# Patient Record
Sex: FEMALE | Race: WHITE | Employment: UNEMPLOYED | URBAN - METROPOLITAN AREA
[De-identification: names, ages, dates, MRNs, and addresses within clinical notes are randomized per-mention and may not be internally consistent; named-entity substitution may affect disease eponyms.]

---

## 2017-02-02 DIAGNOSIS — B96.89 BV (BACTERIAL VAGINOSIS): Primary | ICD-10-CM

## 2017-02-02 DIAGNOSIS — N76.0 BV (BACTERIAL VAGINOSIS): Primary | ICD-10-CM

## 2017-02-02 RX ORDER — CLINDAMYCIN PHOSPHATE 20 MG/G
CREAM VAGINAL
Qty: 1 TUBE | Refills: 0 | Status: SHIPPED | OUTPATIENT
Start: 2017-02-02 | End: 2017-02-09

## 2017-02-03 ENCOUNTER — TELEPHONE (OUTPATIENT)
Dept: GYNECOLOGY | Age: 52
End: 2017-02-03

## 2017-08-29 DIAGNOSIS — B37.31 VAGINAL YEAST INFECTION: Primary | ICD-10-CM

## 2017-08-30 RX ORDER — FLUCONAZOLE 150 MG/1
150 TABLET ORAL ONCE
Qty: 1 TABLET | Refills: 1 | Status: SHIPPED | OUTPATIENT
Start: 2017-08-30 | End: 2017-08-30

## 2017-10-12 ENCOUNTER — OFFICE VISIT (OUTPATIENT)
Dept: GYNECOLOGY | Age: 52
End: 2017-10-12

## 2017-10-12 VITALS
SYSTOLIC BLOOD PRESSURE: 125 MMHG | OXYGEN SATURATION: 95 % | DIASTOLIC BLOOD PRESSURE: 84 MMHG | BODY MASS INDEX: 38.11 KG/M2 | WEIGHT: 229 LBS | HEART RATE: 77 BPM

## 2017-10-12 DIAGNOSIS — N89.8 VAGINAL ITCHING: Primary | ICD-10-CM

## 2017-10-12 LAB
BACTERIA WET PREP: NORMAL
CLUE CELLS: NORMAL
EPITHELIAL CELLS WET PREP: NORMAL
RBC WET PREP: NORMAL
SOURCE WET PREP: NORMAL
TRICHOMONAS PREP: NORMAL
WBC WET PREP: NORMAL
YEAST WET PREP: NORMAL

## 2017-10-12 PROCEDURE — 99213 OFFICE O/P EST LOW 20 MIN: CPT | Performed by: NURSE PRACTITIONER

## 2017-10-12 RX ORDER — NYSTATIN 100000 [USP'U]/G
POWDER TOPICAL
Qty: 1 BOTTLE | Refills: 0 | Status: SHIPPED | OUTPATIENT
Start: 2017-10-12 | End: 2018-07-27 | Stop reason: ALTCHOICE

## 2017-10-12 RX ORDER — FLUCONAZOLE 150 MG/1
150 TABLET ORAL ONCE
Qty: 1 TABLET | Refills: 0 | Status: SHIPPED | OUTPATIENT
Start: 2017-10-12 | End: 2017-10-12

## 2017-10-12 NOTE — PATIENT INSTRUCTIONS
Patient Education        Bacterial Vaginosis: Care Instructions  Your Care Instructions    Bacterial vaginosis is a type of vaginal infection. It is caused by excess growth of certain bacteria that are normally found in the vagina. Symptoms can include itching, swelling, pain when you urinate or have sex, and a gray or yellow discharge with a \"fishy\" odor. It is not considered an infection that is spread through sexual contact. Although symptoms can be annoying and uncomfortable, bacterial vaginosis does not usually cause other health problems. However, if you have it while you are pregnant, it can cause complications. While the infection may go away on its own, most doctors use antibiotics to treat it. You may have been prescribed pills or vaginal cream. With treatment, bacterial vaginosis usually clears up in 5 to 7 days. Follow-up care is a key part of your treatment and safety. Be sure to make and go to all appointments, and call your doctor if you are having problems. It's also a good idea to know your test results and keep a list of the medicines you take. How can you care for yourself at home? · Take your antibiotics as directed. Do not stop taking them just because you feel better. You need to take the full course of antibiotics. · Do not eat or drink anything that contains alcohol if you are taking metronidazole (Flagyl). · Keep using your medicine if you start your period. Use pads instead of tampons while using a vaginal cream or suppository. Tampons can absorb the medicine. · Wear loose cotton clothing. Do not wear nylon and other materials that hold body heat and moisture close to the skin. · Do not scratch. Relieve itching with a cold pack or a cool bath. · Do not wash your vaginal area more than once a day. Use plain water or a mild, unscented soap. Do not douche. When should you call for help?   Watch closely for changes in your health, and be sure to contact your doctor if:  · You have medicine. · Ask your doctor about over-the-counter (OTC) medicines for yeast infections. They may cost less than prescription medicines. If you use an OTC treatment, read and follow all instructions on the label. · Do not use tampons while using a vaginal cream or suppository. The tampons can absorb the medicine. Use pads instead. · Wear loose cotton clothing. Do not wear nylon or other fabric that holds body heat and moisture close to the skin. · Try sleeping without underwear. · Do not scratch. Relieve itching with a cold pack or a cool bath. · Do not wash your vaginal area more than once a day. Use plain water or a mild, unscented soap. Air-dry the vaginal area. · Change out of wet swimsuits after swimming. · Do not have sex until you have finished your treatment. · Do not douche. When should you call for help? Call your doctor now or seek immediate medical care if:  · You have unexpected vaginal bleeding. · You have new or increased pain in your vagina or pelvis. Watch closely for changes in your health, and be sure to contact your doctor if:  · You have a fever. · You are not getting better after 2 days. · Your symptoms come back after you finish your medicines. Where can you learn more? Go to https://CardioGenicspeAdKeeper.Antegrin Therapeutics. org and sign in to your DigiMeld account. Enter K460 in the [facility] box to learn more about \"Vaginal Yeast Infection: Care Instructions. \"     If you do not have an account, please click on the \"Sign Up Now\" link. Current as of: October 13, 2016  Content Version: 11.3  © 2986-6110 RABBL. Care instructions adapted under license by [facility] ([facility]). If you have questions about a medical condition or this instruction, always ask your healthcare professional. [place] [address] any warranty or liability for your use of this information.        Patient Education        Yeast Skin Infection: Care Instructions  Your Care

## 2017-10-14 LAB
GENITAL CULTURE, ROUTINE: ABNORMAL
GENITAL CULTURE, ROUTINE: ABNORMAL
ORGANISM: ABNORMAL

## 2017-10-16 RX ORDER — CLINDAMYCIN PHOSPHATE 20 MG/G
CREAM VAGINAL
Qty: 1 TUBE | Refills: 0 | Status: SHIPPED | OUTPATIENT
Start: 2017-10-16 | End: 2018-07-27 | Stop reason: ALTCHOICE

## 2018-05-23 DIAGNOSIS — B37.31 VAGINAL YEAST INFECTION: Primary | ICD-10-CM

## 2018-05-23 RX ORDER — FLUCONAZOLE 150 MG/1
150 TABLET ORAL ONCE
Qty: 2 TABLET | Refills: 0 | Status: SHIPPED | OUTPATIENT
Start: 2018-05-23 | End: 2018-05-23

## 2018-07-27 ENCOUNTER — OFFICE VISIT (OUTPATIENT)
Dept: GYNECOLOGY | Age: 53
End: 2018-07-27

## 2018-07-27 VITALS
BODY MASS INDEX: 38.61 KG/M2 | SYSTOLIC BLOOD PRESSURE: 132 MMHG | DIASTOLIC BLOOD PRESSURE: 75 MMHG | HEART RATE: 71 BPM | WEIGHT: 232 LBS

## 2018-07-27 DIAGNOSIS — R35.0 URINARY FREQUENCY: ICD-10-CM

## 2018-07-27 DIAGNOSIS — Z12.4 SCREENING FOR CERVICAL CANCER: ICD-10-CM

## 2018-07-27 DIAGNOSIS — Z01.419 WOMEN'S ANNUAL ROUTINE GYNECOLOGICAL EXAMINATION: Primary | ICD-10-CM

## 2018-07-27 DIAGNOSIS — Z12.39 SCREENING FOR BREAST CANCER: ICD-10-CM

## 2018-07-27 DIAGNOSIS — N30.10 INTERSTITIAL CYSTITIS: ICD-10-CM

## 2018-07-27 PROCEDURE — 99396 PREV VISIT EST AGE 40-64: CPT | Performed by: NURSE PRACTITIONER

## 2018-07-27 PROCEDURE — 81002 URINALYSIS NONAUTO W/O SCOPE: CPT | Performed by: NURSE PRACTITIONER

## 2018-07-27 RX ORDER — ROSUVASTATIN CALCIUM 10 MG/1
10 TABLET, COATED ORAL DAILY
COMMUNITY

## 2018-07-27 NOTE — PATIENT INSTRUCTIONS
risk for heart attack and stroke. · Blood pressure. Have your blood pressure checked during a routine doctor visit. Your doctor will tell you how often to check your blood pressure based on your age, your blood pressure results, and other factors. · Mammogram. Ask your doctor how often you should have a mammogram, which is an X-ray of your breasts. A mammogram can spot breast cancer before it can be felt and when it is easiest to treat. · Pap test and pelvic exam. Ask your doctor how often you should have a Pap test. You may not need to have a Pap test as often as you used to. · Vision. Have your eyes checked every year or two or as often as your doctor suggests. Some experts recommend that you have yearly exams for glaucoma and other age-related eye problems starting at age 48. · Hearing. Tell your doctor if you notice any change in your hearing. You can have tests to find out how well you hear. · Diabetes. Ask your doctor whether you should have tests for diabetes. · Colon cancer. You should begin tests for colon cancer at age 48. You may have one of several tests. Your doctor will tell you how often to have tests based on your age and risk. Risks include whether you already had a precancerous polyp removed from your colon or whether your parents, sisters and brothers, or children have had colon cancer. · Thyroid disease. Talk to your doctor about whether to have your thyroid checked as part of a regular physical exam. Women have an increased chance of a thyroid problem. · Osteoporosis. You should begin tests for bone density at age 72. If you are younger than 72, ask your doctor whether you have factors that may increase your risk for this disease. You may want to have this test before age 72. · Heart attack and stroke risk. At least every 4 to 6 years, you should have your risk for heart attack and stroke assessed.  Your doctor uses factors such as your age, blood pressure, cholesterol, and whether you the best health, women should have no more than 1 drink a day or 7 drinks a week. · If you smoke, quit. When you quit smoking, you lower your chances of getting many types of cancer. You can also do your best to eat well, be active, and stay at a healthy weight. Eating healthy foods and being active every day, as well as staying at a healthy weight, may help prevent cancer. Where can you learn more? Go to https://CrestaTechgabrielle.Agworld Pty Ltd. org and sign in to your [employer] account. Enter W751 in the REALTIME.CO box to learn more about \"Learning About Breast Cancer Screening. \"     If you do not have an account, please click on the \"Sign Up Now\" link. Current as of: May 12, 2017  Content Version: 11.6  © 5093-6246 [employer] Solar [employer], RevoLaze. Care instructions adapted under license by [facility] ([facility]). If you have questions about a medical condition or this instruction, always ask your healthcare professional. [place] [address] any warranty or liability for your use of this information. Patient Education        Breast Self-Exam: Care Instructions  Your Care Instructions    A breast self-exam is when you check your breasts for lumps or changes. This regular exam helps you learn how your breasts normally look and feel. Most breast problems or changes are not because of cancer. Breast self-exam is not a substitute for a mammogram. Having regular breast exams by your doctor and regular mammograms improve your chances of finding any problems with your breasts. Some women set a time each month to do a step-by-step breast self-exam. Other women like a less formal system. They might look at their breasts as they brush their teeth, or feel their breasts once in a while in the shower. If you notice a change in your breast, tell your doctor. Follow-up care is a key part of your treatment and safety. Be sure to make and go to all appointments, and call your doctor if you are having problems. It's also a good idea to know your test results and keep a list of the medicines you take. How do you do a breast self-exam?  · The best time to examine your breasts is usually one week after your menstrual period begins. Your breasts should not be tender then. If you do not have periods, you might do your exam on a day of the month that is easy to remember. · To examine your breasts:  ¨ Remove all your clothes above the waist and lie down. When you are lying down, your breast tissue spreads evenly over your chest wall, which makes it easier to feel all your breast tissue. ¨ Use the pads-not the fingertips-of the 3 middle fingers of your left hand to check your right breast. Move your fingers slowly in small coin-sized circles that overlap. ¨ Use three levels of pressure to feel of all your breast tissue. Use light pressure to feel the tissue close to the skin surface. Use medium pressure to feel a little deeper. Use firm pressure to feel your tissue close to your breastbone and ribs. Use each pressure level to feel your breast tissue before moving on to the next spot. ¨ Check your entire breast, moving up and down as if following a strip from the collarbone to the bra line, and from the armpit to the ribs. Repeat until you have covered the entire breast.  ¨ Repeat this procedure for your left breast, using the pads of the 3 middle fingers of your right hand. · To examine your breasts while in the shower:  ¨ Place one arm over your head and lightly soap your breast on that side. ¨ Using the pads of your fingers, gently move your hand over your breast (in the strip pattern described above), feeling carefully for any lumps or changes. ¨ Repeat for the other breast.  · Have your doctor inspect anything you notice to see if you need further testing. Where can you learn more? Go to https://chgabrielle.Jixee. org and sign in to your Dr. TATTOFF account.  Enter P148 in the ZUGGI box to cereal.  ¨ 2½ cups of vegetables, especially:  § Dark-green vegetables such as broccoli and spinach. § Orange vegetables such as carrots and sweet potatoes. § Dry beans (such as lopez and kidney beans) and peas (such as lentils). ¨ 2 cups of fresh, frozen, or canned fruit. A small apple or 1 banana or orange equals 1 cup. ¨ 3 cups of nonfat or low-fat milk, yogurt, or other milk products. ¨ 5½ ounces of meat and beans, such as chicken, fish, lean meat, beans, nuts, and seeds. One egg, 1 tablespoon of peanut butter, ½ ounce nuts or seeds, or ¼ cup of cooked beans equals 1 ounce of meat. · Learn how to read food labels for serving sizes and ingredients. Fast-food and convenience-food meals often contain few or no fruits or vegetables. Make sure you eat some fruits and vegetables to make the meal more nutritious. · Look at your food diary. For each food group, add up what you have eaten and then divide the total by the number of days. This will give you an idea of how much you are eating from each food group. See if you can find some ways to change your diet to make it more healthy. Start small  · Do not try to make dramatic changes to your diet all at once. You might feel that you are missing out on your favorite foods and then be more likely to fail. · Start slowly, and gradually change your habits. Try some of the following:  ¨ Use whole wheat bread instead of white bread. ¨ Use nonfat or low-fat milk instead of whole milk. ¨ Eat brown rice instead of white rice, and eat whole wheat pasta instead of white-flour pasta. ¨ Try low-fat cheeses and low-fat yogurt. ¨ Add more fruits and vegetables to meals and have them for snacks. ¨ Add lettuce, tomato, cucumber, and onion to sandwiches. ¨ Add fruit to yogurt and cereal.  Enjoy food  · You can still eat your favorite foods. You just may need to eat less of them.  If your favorite foods are high in fat, salt, and sugar, limit how often you eat them, but do not cut them out entirely. · Eat a wide variety of foods. Make healthy choices when eating out  · The type of restaurant you choose can help you make healthy choices. Even fast-food chains are now offering more low-fat or healthier choices on the menu. · Choose smaller portions, or take half of your meal home. · When eating out, try:  ¨ A veggie pizza with a whole wheat crust or grilled chicken (instead of sausage or pepperoni). ¨ Pasta with roasted vegetables, grilled chicken, or marinara sauce instead of cream sauce. ¨ A vegetable wrap or grilled chicken wrap. ¨ Broiled or poached food instead of fried or breaded items. Make healthy choices easy  · Buy packaged, prewashed, ready-to-eat fresh vegetables and fruits, such as baby carrots, salad mixes, and chopped or shredded broccoli and cauliflower. · Buy packaged, presliced fruits, such as melon or pineapple. · Choose 100% fruit or vegetable juice instead of soda. Limit juice intake to 4 to 6 oz (½ to ¾ cup) a day. · Blend low-fat yogurt, fruit juice, and canned or frozen fruit to make a smoothie for breakfast or a snack. Where can you learn more? Go to https://Capital Alliance Software.Greenlet Technologies. org and sign in to your AdhereTx account. Enter U263 in the [facility] box to learn more about \"Eating Healthy Foods: Care Instructions. \"     If you do not have an account, please click on the \"Sign Up Now\" link. Current as of: May 12, 2017  Content Version: 11.6  © 2640-0838 ExecOnline, Bigpoint. Care instructions adapted under license by [facility] ([facility]). If you have questions about a medical condition or this instruction, always ask your healthcare professional. [place] [address] any warranty or liability for your use of this information. Patient Education        Learning About Physical Activity  What is physical activity? Physical activity is any kind of activity that gets your body moving.   The types of physical activity for a hike or swim. · Go in-line skating. · Play a game of touch football, basketball, or soccer. · Ride a bike. · Play tennis or racquetball. · Climb stairs. Even some household chores can be aerobic-just do them at a faster pace. Vacuuming, raking or mowing the lawn, sweeping the garage, and washing and waxing the car all can help get your heart rate up. Strengthen your muscles during the week. You don't have to lift heavy weights or grow big, bulky muscles to get stronger. Doing a few simple activities that make your muscles work against, or \"resist,\" something can help you get stronger. For example, you can:  · Do push-ups or sit-ups, which use your own body weight as resistance. · Lift weights or dumbbells or use stretch bands at home or in a gym or community center. Stretch your muscles often. Stretching will help you as you become more active. It can help you stay flexible, loosen tight muscles, and avoid injury. It can also help improve your balance and posture and can be a great way to relax. Be sure to stretch the muscles you'll be using when you work out. It's best to warm your muscles slightly before you stretch them. Walk or do some other light aerobic activity for a few minutes, and then start stretching. When you stretch your muscles:  · Do it slowly. Stretching is not about going fast or making sudden movements. · Don't push or bounce during a stretch. · Hold each stretch for at least 15 to 30 seconds, if you can. You should feel a stretch in the muscle, but not pain. · Breathe out as you do the stretch. Then breathe in as you hold the stretch. Don't hold your breath. If you're worried about how more activity might affect your health, have a checkup before you start. Follow any special advice your doctor gives you for getting a smart start. Where can you learn more? Go to https://talia.health-Earthineer. org and sign in to your MarkMonitor account.  Enter Q466 in the [address] [employer] box to learn more about \"Learning About Physical Activity. \"     If you do not have an account, please click on the \"Sign Up Now\" link. Current as of: December 7, 2017  Content Version: 11.6  © 5055-4027 Excelsior Industries. Care instructions adapted under license by [facility] ([facility]). If you have questions about a medical condition or this instruction, always ask your healthcare professional. [address] any warranty or liability for your use of this information. Patient Education        Learning About Calcium  What is calcium? Calcium keeps your bones and muscles-including your heart-healthy and strong. Your body needs vitamin D to absorb calcium. People who don't get enough calcium and vitamin D throughout life have an increased chance of having thin and brittle bones (osteoporosis) in their later years. Thin and brittle bones break easily. They can lead to serious injuries. This is why it's important for you to get enough calcium and vitamin D as a child and as an adult. It helps keep your bones strong as you get older. And it protects you against possible breaks. Your body also uses vitamin D to help your muscles absorb calcium and work well. If your muscles don't get enough calcium, then they can cramp, hurt, or feel weak. You may have long-term (chronic) muscle aches and pains. How much calcium do you need? How much calcium you need each day changes as you age. The [place] of Medicine recommends the following amounts of calcium each day. · Ages 1 to 3 years: 700 milligrams  · Ages 4 to 8 years: 1,000 milligrams  · Ages 5 to 25 years: 1,300 milligrams  · Ages 23 to 48 years: 1,000 milligrams  · Males 46 to 79 years: 1,000 milligrams  · Females 46 to 79 years: 1,200 milligrams  · Ages 70 and older: 1,200 milligrams  Women who are pregnant or breastfeeding need the same amount of calcium and vitamin D as other women their age.   How can you get enough also may feel a sudden urge to urinate or need to urinate often. Sometimes the walls of the bladder become scarred or get stiff. Doctors do not know what causes interstitial cystitis. But they do know that it is not caused by an infection. The problem is much more common in women than in men. Your doctor may do tests to make sure that you do not have an infection, kidney stones, or bladder cancer. Because the cause of interstitial cystitis is not known, your doctor may try several treatments. It may take several weeks or months to find a treatment that works. If diet and lifestyle changes do not help, you may need medicine. Your doctor may also put liquid or medicine into your bladder for a short time to treat the pain. Follow-up care is a key part of your treatment and safety. Be sure to make and go to all appointments, and call your doctor if you are having problems. It's also a good idea to know your test results and keep a list of the medicines you take. How can you care for yourself at home? · Take your medicines exactly as prescribed. Call your doctor if you think you are having a problem with your medicine. · If your doctor prescribed antibiotics, take them as directed. Do not stop taking them just because you feel better. You need to take the full course of antibiotics. · Avoid eating spicy foods or high-acid foods, such as tomatoes and oranges, if these foods seem to make your pain worse. Also, limit caffeine and alcohol. · If a certain food seems to cause pain in your bladder, stop eating it to see if the pain goes away. · Do not smoke. Smoking can irritate the bladder and cause bladder cancer. If you need help quitting, talk to your doctor about stop-smoking programs and medicines. These can increase your chances of quitting for good. · Try bladder training. Set certain times to go to the bathroom and slowly increase the time between visits.  This may help lengthen the time your bladder can hold urine.  · You might try a treatment called TENS. It sends a very mild electric current through wires placed near the pubic area. This is done for at least several minutes 2 times each day. · Consider a support group. Sharing your experiences with other people who have the same problem may help you learn more and cope better. · Wash your pubic area with a mild soap. Avoid deodorant soaps or soaps with heavy perfumes. · Wear loose-fitting clothing that does not put pressure on your bladder. When should you call for help? Call your doctor now or seek immediate medical care if:    · You have symptoms of a urinary infection. For example:  ¨ You have blood or pus in your urine. ¨ You have pain in your back just below your rib cage. This is called flank pain. ¨ You have a fever, chills, or body aches. ¨ It hurts to urinate. ¨ You have groin or belly pain.    Watch closely for changes in your health, and be sure to contact your doctor if:    · You do not get better as expected. Where can you learn more? Go to https://SensewarepeStaphOff Biotech.AutoAlert. org and sign in to your Kwarter account. Enter K678 in the Deck App Technologies box to learn more about \"Interstitial Cystitis: Care Instructions. \"     If you do not have an account, please click on the \"Sign Up Now\" link. Current as of: May 12, 2017  Content Version: 11.6  © 8994-5857 Huddlebuy, Konnecti.com. Care instructions adapted under license by [facility] ([facility]). If you have questions about a medical condition or this instruction, always ask your healthcare professional. [place] [address] any warranty or liability for your use of this information.

## 2018-07-27 NOTE — PROGRESS NOTES
[address] [facility] Gynecology  Annual GYN Visit  [facility]  YOB: 1965    Date of Service:  7/27/2018    Vitals:    07/27/18 1212   BP: 132/75   Site: Right Arm   Position: Sitting   Cuff Size: Medium Adult   Pulse: 71   Weight: 232 lb (105.2 kg)     Body mass index is 38.61 kg/m². Wt Readings from Last 3 Encounters:   07/27/18 232 lb (105.2 kg)   10/12/17 229 lb (103.9 kg)   04/08/17 221 lb 1.9 oz (100.3 kg)     BP Readings from Last 3 Encounters:   07/27/18 132/75   10/12/17 125/84   04/08/17 (!) 143/76       Chief Complaint:   Almas Rice is a 46 y.o. female who presents for routine annual gynecologic visit. HPI:  Patient is s/p hysterectomy. She reports some urinary frequency for a few months with some stress incontinence. She denies pelvic pain, vaginal discharge, vulvar/vaginal itching, vulvar/vaginal irritation/pain, urinary urgency, and dysuria. Menopausal/perimenopausal symptoms: hot flashes. Hormone therapy side effects: not applicable. Patient is currently sexually active;monogmaous with spouse. Allergies   Allergen Reactions    Benadryl [Diphenhydramine Hcl]     Codeine     Flagyl [Metronidazole] Nausea Only    Penicillins Hives    Prochlorperazine Edisylate      jitters    Reglan [Metoclopramide Hcl]      \"put me on edge\"    Sudafed [Pseudoephedrine Hcl]      jitters    Sulfa Antibiotics Hives    Dilaudid [Hydromorphone Hcl] Nausea And Vomiting     Outpatient Prescriptions Marked as Taking for the 7/27/18 encounter (Office Visit) with DIANA Bishop CNP   Medication Sig Dispense Refill    rosuvastatin (CRESTOR) 10 MG tablet Take 10 mg by mouth daily      therapeutic multivitamin-minerals (THERAGRAN-M) tablet Take 1 tablet by mouth daily.  propranolol (INDERAL) 20 MG tablet Take 160 mg by mouth Daily Patient not sure of dose      fluoxetine (PROZAC) 40 MG capsule Take 20 mg by mouth daily.       promethazine (PHENERGAN) 25 MG tablet Take 25 mg by mouth every 6 hours as needed. Review of Systems:  A comprehensive review of systems was negative except for what was noted in the HPI. Physical Exam:  Constitutional: She is oriented to person, place, and time. She appears well-developed and well-nourished. No distress. Neck: No mass and no thyromegaly present. Cardiovascular: Normal rate, regular rhythm and normal heart sounds. No murmur heard. Pulmonary/Chest: Effort and breath sounds normal.   Abdominal: Soft, non-tender. No distension or masses. Genitourinary: normal external genitalia, vulva, vagina, and adnexa. Cervix and uterus are surgically absent  Normal urethra; bladder unpalpable. Breast exam:  breasts appear normal, no suspicious masses, no skin or nipple changes or axillary nodes. Neurological: She is alert and oriented to person, place, and time. Skin: Skin is warm and dry. No rash noted. No erythema. Psychiatric: She has a normal mood and affect.  Her behavior is normal.     Preventive Care and Risk Factor Assessment:  Hx abnormal PAP: no; last 12/9/2016  Self-breast exams: yes  Hx of abnormal mammogram: yes - lumpectomy in 1999- benign; last 11/2017  Colonoscopy: yes; last 2017- due in 2027  FH of breast cancer: no  FH of GYN cancer: yes - mother with uterine cancer   Previous DEXA scan: yes- 11/2016, normal  Exercise: no regular exercise; active  Social History   Substance Use Topics    Smoking status: Former Smoker     Packs/day: 0.50     Years: 3.00     Quit date: 1/22/1984    Smokeless tobacco: Never Used    Alcohol use No      History   Sexual Activity    Sexual activity: Yes    Partners: Male     Hx of STD: no     Health Maintenance   Topic Date Due    Hepatitis C screen  1965    HIV screen  09/11/1980    DTaP/Tdap/Td vaccine (1 - Tdap) 09/11/1984    Lipid screen  09/11/2005    Shingles Vaccine (1 of 2 - 2 Dose Series) 09/11/2015    Colon cancer screen

## 2018-07-30 LAB
GENITAL CULTURE, ROUTINE: ABNORMAL
GENITAL CULTURE, ROUTINE: ABNORMAL
ORGANISM: ABNORMAL

## 2018-07-31 RX ORDER — CLINDAMYCIN PHOSPHATE 20 MG/G
CREAM VAGINAL
Qty: 1 TUBE | Refills: 0 | Status: SHIPPED | OUTPATIENT
Start: 2018-07-31 | End: 2022-07-28

## 2018-08-01 ENCOUNTER — TELEPHONE (OUTPATIENT)
Dept: GYNECOLOGY | Age: 53
End: 2018-08-01

## 2018-08-01 LAB
HPV COMMENT: NORMAL
HPV TYPE 16: NOT DETECTED
HPV TYPE 18: NOT DETECTED
HPVOH (OTHER TYPES): NOT DETECTED

## 2018-10-12 ENCOUNTER — TELEPHONE (OUTPATIENT)
Dept: GYNECOLOGY | Age: 53
End: 2018-10-12

## 2018-10-13 RX ORDER — FLUCONAZOLE 150 MG/1
150 TABLET ORAL ONCE
Qty: 1 TABLET | Refills: 0 | Status: SHIPPED | OUTPATIENT
Start: 2018-10-13 | End: 2018-10-13

## 2018-10-15 ENCOUNTER — OFFICE VISIT (OUTPATIENT)
Dept: ENT CLINIC | Age: 53
End: 2018-10-15
Payer: MEDICARE

## 2018-10-15 VITALS — HEART RATE: 96 BPM | OXYGEN SATURATION: 95 % | DIASTOLIC BLOOD PRESSURE: 80 MMHG | SYSTOLIC BLOOD PRESSURE: 142 MMHG

## 2018-10-15 DIAGNOSIS — H92.11 OTORRHEA OF RIGHT EAR: Primary | ICD-10-CM

## 2018-10-15 PROCEDURE — G8484 FLU IMMUNIZE NO ADMIN: HCPCS | Performed by: OTOLARYNGOLOGY

## 2018-10-15 PROCEDURE — G8427 DOCREV CUR MEDS BY ELIG CLIN: HCPCS | Performed by: OTOLARYNGOLOGY

## 2018-10-15 PROCEDURE — G8417 CALC BMI ABV UP PARAM F/U: HCPCS | Performed by: OTOLARYNGOLOGY

## 2018-10-15 PROCEDURE — 3017F COLORECTAL CA SCREEN DOC REV: CPT | Performed by: OTOLARYNGOLOGY

## 2018-10-15 PROCEDURE — 99203 OFFICE O/P NEW LOW 30 MIN: CPT | Performed by: OTOLARYNGOLOGY

## 2018-10-15 PROCEDURE — 1036F TOBACCO NON-USER: CPT | Performed by: OTOLARYNGOLOGY

## 2018-10-15 RX ORDER — OXYCODONE HYDROCHLORIDE AND ACETAMINOPHEN 5; 325 MG/1; MG/1
1 TABLET ORAL EVERY 6 HOURS PRN
Qty: 20 TABLET | Refills: 0 | Status: SHIPPED | OUTPATIENT
Start: 2018-10-15 | End: 2018-10-22

## 2018-10-15 RX ORDER — NEOMYCIN SULFATE, POLYMYXIN B SULFATE AND HYDROCORTISONE 10; 3.5; 1 MG/ML; MG/ML; [USP'U]/ML
3 SUSPENSION/ DROPS AURICULAR (OTIC) 3 TIMES DAILY
Qty: 10 ML | Refills: 1 | Status: SHIPPED | OUTPATIENT
Start: 2018-10-15 | End: 2018-10-25

## 2018-10-15 RX ORDER — LEVOFLOXACIN 500 MG/1
500 TABLET, FILM COATED ORAL DAILY
Qty: 10 TABLET | Refills: 0 | Status: SHIPPED | OUTPATIENT
Start: 2018-10-15 | End: 2018-10-29 | Stop reason: ALTCHOICE

## 2018-10-15 ASSESSMENT — ENCOUNTER SYMPTOMS
RHINORRHEA: 0
TROUBLE SWALLOWING: 0
ALLERGIC/IMMUNOLOGIC NEGATIVE: 1
FACIAL SWELLING: 0
EYES NEGATIVE: 1
SINUS PAIN: 0
SINUS PRESSURE: 0
VOICE CHANGE: 0
RESPIRATORY NEGATIVE: 1
SORE THROAT: 1

## 2018-10-16 ENCOUNTER — TELEPHONE (OUTPATIENT)
Dept: ENT CLINIC | Age: 53
End: 2018-10-16

## 2018-10-17 ENCOUNTER — OFFICE VISIT (OUTPATIENT)
Dept: ENT CLINIC | Age: 53
End: 2018-10-17
Payer: MEDICARE

## 2018-10-17 ENCOUNTER — TELEPHONE (OUTPATIENT)
Dept: ENT CLINIC | Age: 53
End: 2018-10-17

## 2018-10-17 VITALS — SYSTOLIC BLOOD PRESSURE: 126 MMHG | HEART RATE: 76 BPM | OXYGEN SATURATION: 96 % | DIASTOLIC BLOOD PRESSURE: 74 MMHG

## 2018-10-17 DIAGNOSIS — H60.311 ACUTE DIFFUSE OTITIS EXTERNA OF RIGHT EAR: Primary | ICD-10-CM

## 2018-10-17 PROCEDURE — G8417 CALC BMI ABV UP PARAM F/U: HCPCS | Performed by: OTOLARYNGOLOGY

## 2018-10-17 PROCEDURE — 1036F TOBACCO NON-USER: CPT | Performed by: OTOLARYNGOLOGY

## 2018-10-17 PROCEDURE — 99213 OFFICE O/P EST LOW 20 MIN: CPT | Performed by: OTOLARYNGOLOGY

## 2018-10-17 PROCEDURE — G8484 FLU IMMUNIZE NO ADMIN: HCPCS | Performed by: OTOLARYNGOLOGY

## 2018-10-17 PROCEDURE — 3017F COLORECTAL CA SCREEN DOC REV: CPT | Performed by: OTOLARYNGOLOGY

## 2018-10-17 PROCEDURE — 4130F TOPICAL PREP RX AOE: CPT | Performed by: OTOLARYNGOLOGY

## 2018-10-17 PROCEDURE — G8427 DOCREV CUR MEDS BY ELIG CLIN: HCPCS | Performed by: OTOLARYNGOLOGY

## 2018-10-17 NOTE — TELEPHONE ENCOUNTER
Probably has more to do with my cleaning anything else. To her not to worry but to continue the treatment we discussed.

## 2018-10-22 ENCOUNTER — OFFICE VISIT (OUTPATIENT)
Dept: ENT CLINIC | Age: 53
End: 2018-10-22
Payer: MEDICARE

## 2018-10-22 VITALS — SYSTOLIC BLOOD PRESSURE: 110 MMHG | OXYGEN SATURATION: 96 % | HEART RATE: 76 BPM | DIASTOLIC BLOOD PRESSURE: 76 MMHG

## 2018-10-22 DIAGNOSIS — H60.311 ACUTE DIFFUSE OTITIS EXTERNA OF RIGHT EAR: Primary | ICD-10-CM

## 2018-10-22 PROCEDURE — G8484 FLU IMMUNIZE NO ADMIN: HCPCS | Performed by: OTOLARYNGOLOGY

## 2018-10-22 PROCEDURE — 1036F TOBACCO NON-USER: CPT | Performed by: OTOLARYNGOLOGY

## 2018-10-22 PROCEDURE — 3017F COLORECTAL CA SCREEN DOC REV: CPT | Performed by: OTOLARYNGOLOGY

## 2018-10-22 PROCEDURE — 99212 OFFICE O/P EST SF 10 MIN: CPT | Performed by: OTOLARYNGOLOGY

## 2018-10-22 PROCEDURE — G8417 CALC BMI ABV UP PARAM F/U: HCPCS | Performed by: OTOLARYNGOLOGY

## 2018-10-22 PROCEDURE — G8427 DOCREV CUR MEDS BY ELIG CLIN: HCPCS | Performed by: OTOLARYNGOLOGY

## 2018-10-22 PROCEDURE — 4130F TOPICAL PREP RX AOE: CPT | Performed by: OTOLARYNGOLOGY

## 2018-10-25 ENCOUNTER — TELEPHONE (OUTPATIENT)
Dept: ENT CLINIC | Age: 53
End: 2018-10-25

## 2018-10-25 DIAGNOSIS — H60.311 ACUTE DIFFUSE OTITIS EXTERNA OF RIGHT EAR: Primary | ICD-10-CM

## 2018-10-25 RX ORDER — METHYLPREDNISOLONE 4 MG/1
4 TABLET ORAL SEE ADMIN INSTRUCTIONS
Qty: 1 KIT | Refills: 0 | Status: SHIPPED | OUTPATIENT
Start: 2018-10-25 | End: 2018-11-01 | Stop reason: ALTCHOICE

## 2018-10-25 NOTE — TELEPHONE ENCOUNTER
Pt c/o pain in ear, fullness in ear again, nasty drainage coming out. Finished the 10 days of oral antibiotics and using drops tid x 10 days also. Asking what she should do. Do you think tube needs removed? Please advise.

## 2018-10-29 ENCOUNTER — OFFICE VISIT (OUTPATIENT)
Dept: ENT CLINIC | Age: 53
End: 2018-10-29
Payer: MEDICARE

## 2018-10-29 VITALS — HEART RATE: 77 BPM | DIASTOLIC BLOOD PRESSURE: 100 MMHG | SYSTOLIC BLOOD PRESSURE: 154 MMHG | OXYGEN SATURATION: 98 %

## 2018-10-29 DIAGNOSIS — H60.311 ACUTE DIFFUSE OTITIS EXTERNA OF RIGHT EAR: Primary | ICD-10-CM

## 2018-10-29 PROCEDURE — 3017F COLORECTAL CA SCREEN DOC REV: CPT | Performed by: OTOLARYNGOLOGY

## 2018-10-29 PROCEDURE — 4130F TOPICAL PREP RX AOE: CPT | Performed by: OTOLARYNGOLOGY

## 2018-10-29 PROCEDURE — G8427 DOCREV CUR MEDS BY ELIG CLIN: HCPCS | Performed by: OTOLARYNGOLOGY

## 2018-10-29 PROCEDURE — 1036F TOBACCO NON-USER: CPT | Performed by: OTOLARYNGOLOGY

## 2018-10-29 PROCEDURE — G8484 FLU IMMUNIZE NO ADMIN: HCPCS | Performed by: OTOLARYNGOLOGY

## 2018-10-29 PROCEDURE — G8417 CALC BMI ABV UP PARAM F/U: HCPCS | Performed by: OTOLARYNGOLOGY

## 2018-10-29 PROCEDURE — 99213 OFFICE O/P EST LOW 20 MIN: CPT | Performed by: OTOLARYNGOLOGY

## 2018-11-01 ENCOUNTER — OFFICE VISIT (OUTPATIENT)
Dept: ENT CLINIC | Age: 53
End: 2018-11-01
Payer: MEDICARE

## 2018-11-01 VITALS — DIASTOLIC BLOOD PRESSURE: 88 MMHG | SYSTOLIC BLOOD PRESSURE: 144 MMHG | HEART RATE: 72 BPM | OXYGEN SATURATION: 97 %

## 2018-11-01 DIAGNOSIS — T16.1XXA FOREIGN BODY OF RIGHT EAR, INITIAL ENCOUNTER: Primary | ICD-10-CM

## 2018-11-01 PROCEDURE — 69610 TYMPANIC MEMBRANE REPAIR: CPT | Performed by: OTOLARYNGOLOGY

## 2018-11-01 NOTE — PROGRESS NOTES
Patient continues to have a problem relative to the PE tube in her right ear. Therefore, it has been elected to remove it. Patient is placed on the operating table in the microscope was used for visualization. The PE tube is noted in the posterior inferior quadrant and is removed. The margins are then treated with topical phenol solution a paper patch is applied and the Cortisporin drops are placed in the ear canal with a cotton wick. Patient will then use her current Cortisporin otic suspension twice daily with 3 drops in the right ear for 3 days. She is instructed to avoid water getting into the ear during that interim and I will see her again in 1 week.

## 2018-11-09 ENCOUNTER — OFFICE VISIT (OUTPATIENT)
Dept: ENT CLINIC | Age: 53
End: 2018-11-09

## 2018-11-09 VITALS — HEART RATE: 82 BPM | DIASTOLIC BLOOD PRESSURE: 90 MMHG | OXYGEN SATURATION: 96 % | SYSTOLIC BLOOD PRESSURE: 152 MMHG

## 2018-11-09 DIAGNOSIS — H92.11 OTORRHEA OF RIGHT EAR: Primary | ICD-10-CM

## 2018-11-09 PROCEDURE — 99024 POSTOP FOLLOW-UP VISIT: CPT | Performed by: OTOLARYNGOLOGY

## 2019-01-24 ENCOUNTER — TELEPHONE (OUTPATIENT)
Dept: GYNECOLOGY | Age: 54
End: 2019-01-24

## 2019-03-07 ENCOUNTER — TELEPHONE (OUTPATIENT)
Dept: GYNECOLOGY | Age: 54
End: 2019-03-07

## 2019-03-11 ENCOUNTER — OFFICE VISIT (OUTPATIENT)
Dept: GYNECOLOGY | Age: 54
End: 2019-03-11
Payer: MEDICARE

## 2019-03-11 VITALS
HEART RATE: 95 BPM | WEIGHT: 239 LBS | SYSTOLIC BLOOD PRESSURE: 145 MMHG | RESPIRATION RATE: 17 BRPM | DIASTOLIC BLOOD PRESSURE: 86 MMHG | BODY MASS INDEX: 40.8 KG/M2 | HEIGHT: 64 IN

## 2019-03-11 DIAGNOSIS — R10.2 PELVIC PAIN IN FEMALE: ICD-10-CM

## 2019-03-11 DIAGNOSIS — N89.8 VAGINAL DISCHARGE: Primary | ICD-10-CM

## 2019-03-11 DIAGNOSIS — R32 BLADDER LEAK: ICD-10-CM

## 2019-03-11 PROCEDURE — 99213 OFFICE O/P EST LOW 20 MIN: CPT | Performed by: OBSTETRICS & GYNECOLOGY

## 2019-03-11 PROCEDURE — G8417 CALC BMI ABV UP PARAM F/U: HCPCS | Performed by: OBSTETRICS & GYNECOLOGY

## 2019-03-11 PROCEDURE — 1036F TOBACCO NON-USER: CPT | Performed by: OBSTETRICS & GYNECOLOGY

## 2019-03-11 PROCEDURE — G8484 FLU IMMUNIZE NO ADMIN: HCPCS | Performed by: OBSTETRICS & GYNECOLOGY

## 2019-03-11 PROCEDURE — G8427 DOCREV CUR MEDS BY ELIG CLIN: HCPCS | Performed by: OBSTETRICS & GYNECOLOGY

## 2019-03-11 PROCEDURE — 3017F COLORECTAL CA SCREEN DOC REV: CPT | Performed by: OBSTETRICS & GYNECOLOGY

## 2019-03-11 RX ORDER — LEVOTHYROXINE SODIUM 112 UG/1
112 TABLET ORAL
COMMUNITY
Start: 2019-03-05

## 2019-03-11 RX ORDER — NYSTATIN 100000 [USP'U]/G
POWDER TOPICAL 2 TIMES DAILY
Qty: 1 BOTTLE | Refills: 2 | Status: SHIPPED | OUTPATIENT
Start: 2019-03-11 | End: 2020-03-19 | Stop reason: SDUPTHER

## 2019-03-11 RX ORDER — PANTOPRAZOLE SODIUM 40 MG/1
40 TABLET, DELAYED RELEASE ORAL
COMMUNITY
Start: 2019-02-05 | End: 2019-05-06

## 2019-03-14 LAB — GENITAL CULTURE, ROUTINE: NORMAL

## 2019-07-26 ENCOUNTER — HOSPITAL ENCOUNTER (OUTPATIENT)
Dept: ULTRASOUND IMAGING | Age: 54
Discharge: HOME | End: 2019-07-26
Payer: MEDICARE

## 2019-07-26 DIAGNOSIS — R10.2 PELVIC PAIN IN FEMALE: ICD-10-CM

## 2019-07-26 PROCEDURE — 76830 TRANSVAGINAL US NON-OB: CPT

## 2019-07-26 PROCEDURE — 76856 US EXAM PELVIC COMPLETE: CPT

## 2019-11-08 ENCOUNTER — TELEPHONE (OUTPATIENT)
Dept: GYNECOLOGY | Age: 54
End: 2019-11-08

## 2019-11-08 RX ORDER — FLUCONAZOLE 150 MG/1
150 TABLET ORAL ONCE
Qty: 1 TABLET | Refills: 1 | Status: SHIPPED | OUTPATIENT
Start: 2019-11-08 | End: 2019-11-08

## 2020-03-19 RX ORDER — NYSTATIN 100000 [USP'U]/G
POWDER TOPICAL 2 TIMES DAILY
Qty: 1 BOTTLE | Refills: 5 | Status: SHIPPED | OUTPATIENT
Start: 2020-03-19 | End: 2022-07-28

## 2020-09-22 ENCOUNTER — TELEPHONE (OUTPATIENT)
Dept: GYNECOLOGY | Age: 55
End: 2020-09-22

## 2020-09-22 NOTE — TELEPHONE ENCOUNTER
Symptoms: itching, thick, yellow discharge, no odor for 4 days  Patient does not have MyChart  Patient can do a VV  Cell: [phone]  Email: [email or web address]

## 2020-09-22 NOTE — TELEPHONE ENCOUNTER
Tell patient I can call her in some Diflucan if she would like to ry. Call in Diflucan 150 mg po one time. One refill. If she does not get better, then we can have her come in.

## 2020-09-23 DIAGNOSIS — B37.9 YEAST INFECTION: Primary | ICD-10-CM

## 2020-09-23 RX ORDER — FLUCONAZOLE 150 MG/1
150 TABLET ORAL ONCE
Qty: 1 TABLET | Refills: 1 | OUTPATIENT
Start: 2020-09-23 | End: 2020-09-23

## 2020-09-23 NOTE — TELEPHONE ENCOUNTER
Patient returned call, informed her of script that was called in to her RX. Also, told patient if she doesn't get any better then Dr. Lela Burrell would like to see her.

## 2020-09-30 NOTE — PROGRESS NOTES
12/09/16 221 lb (100.2 kg)     BP Readings from Last 3 Encounters:   10/12/17 125/84   04/08/17 (!) 143/76   12/09/16 (!) 150/92      [facility]  Pnacho Isidro is a 46year old female presenting today with C/O vaginal itching and irritation, low back pain and pelvic pain.  Symptoms:  Patient complains of a 2 week(s) history of vaginal itching/irritation, vaginal odor, and pelvic pain. She denies dysuria, frequency, hematuria, fever, nausea/vomiting, diarrhea, constipation and vaginal discharge. Symptoms have been stable with time. Sexually active: Yes - monogamous with spouse. Relevant medical history: none. Treatment to date: monistat about 2 weeks ago, which has been ineffective. Patient reports similar symptoms about 2 months ago and was treated with clindamycin cream with resolution of symptoms until about 2 weeks ago. Review of Systems  As documented in [facility]    Physical Assessment    Physical Exam   Constitutional: She is oriented to person, place, and time. She appears well-developed and well-nourished. No distress. Cardiovascular: Normal rate, regular rhythm and normal heart sounds. Exam reveals no friction rub. No murmur heard. Pulmonary/Chest: Effort normal and breath sounds normal. No respiratory distress. She has no wheezes. Genitourinary: Pelvic exam was performed with patient supine. There is rash (erythematous and swollen) on the right labia. There is no tenderness, lesion or injury on the right labia. There is rash (erythematous and swollen) on the left labia. There is no tenderness, lesion or injury on the left labia. No erythema, tenderness or bleeding in the vagina. No foreign body in the vagina. No signs of injury around the vagina. Vaginal discharge (scant thin clear mucoid non-adherent) found. Neurological: She is alert and oriented to person, place, and time. Skin: Skin is warm and dry. No erythema. Assessment/Plan:    1.  Vaginal itching  Bilateral labia majora erythematous and
2

## 2021-03-16 RX ORDER — FLUCONAZOLE 150 MG/1
150 TABLET ORAL ONCE
Qty: 1 TABLET | Refills: 1 | Status: SHIPPED | OUTPATIENT
Start: 2021-03-16 | End: 2021-03-16

## 2022-03-15 ENCOUNTER — CLINICAL DOCUMENTATION (OUTPATIENT)
Dept: OTHER | Age: 57
End: 2022-03-15

## 2022-07-28 RX ORDER — PANTOPRAZOLE SODIUM 40 MG/1
40 TABLET, DELAYED RELEASE ORAL DAILY
COMMUNITY

## 2022-07-28 RX ORDER — LISINOPRIL 10 MG/1
10 TABLET ORAL DAILY
COMMUNITY

## 2022-07-28 RX ORDER — IBUPROFEN 200 MG
200 TABLET ORAL EVERY 6 HOURS PRN
COMMUNITY

## 2022-07-28 NOTE — PROGRESS NOTES
[facility] ENDOSCOPY COLONOSCOPY PRE-OPERATIVE INSTRUCTIONS    Procedure date__8/2/22_______  Arrival time__0830__________          Surgery time___0930_________       Clear liquids the day before the procedure. Do not eat or drink anything within 5 hours of your procedure. This includes water chewing gum, mints and ice chips. You may brush your teeth and gargle the morning of your surgery, but do not swallow the water    You may be asked to stop blood thinners such as Coumadin, Plavix, Fragmin, Lovenox, etc., or any anti-inflammatories such as:  Aspirin, Ibuprofen, Advil, Naproxen prior to your procedure. We also ask that you stop any OTC medications such as fish oil, vitamin E, glucosamine, garlic, Multivitamins, COQ 10, etc.    You must make arrangements for a responsible adult to arrive with you and stay in our waiting area during your procedure. They will also need to take you home after your procedure. For your safety you will not be allowed to leave alone or drive yourself home. Also for your safety, it is strongly suggested that someone stay with you the first 24 hours after your procedure. For your comfort, please wear simple loose fitting clothing to the center. Please do not bring valuables. If you have a living will and a durable power of  for healthcare, please bring in a copy.      You will need to bring a photo ID and insurance card    Our goal is to provide you with excellent care so if you have any questions, please contact us at the [facility] at [phone]         Please note these are generalized instructions for all colonoscopy cases, you may be provided with more specific instructions if necessary

## 2022-08-01 ENCOUNTER — ANESTHESIA EVENT (OUTPATIENT)
Dept: ENDOSCOPY | Age: 57
End: 2022-08-01
Payer: MEDICARE

## 2022-08-02 ENCOUNTER — HOSPITAL ENCOUNTER (OUTPATIENT)
Age: 57
Setting detail: OUTPATIENT SURGERY
Discharge: HOME | End: 2022-08-02
Attending: INTERNAL MEDICINE | Admitting: INTERNAL MEDICINE
Payer: MEDICARE

## 2022-08-02 ENCOUNTER — ANESTHESIA (OUTPATIENT)
Dept: ENDOSCOPY | Age: 57
End: 2022-08-02
Payer: MEDICARE

## 2022-08-02 VITALS
HEART RATE: 79 BPM | WEIGHT: 245 LBS | BODY MASS INDEX: 40.82 KG/M2 | DIASTOLIC BLOOD PRESSURE: 69 MMHG | TEMPERATURE: 97 F | SYSTOLIC BLOOD PRESSURE: 123 MMHG | RESPIRATION RATE: 16 BRPM | HEIGHT: 65 IN | OXYGEN SATURATION: 95 %

## 2022-08-02 DIAGNOSIS — Z12.11 SCREENING FOR COLON CANCER: ICD-10-CM

## 2022-08-02 DIAGNOSIS — K21.9 GASTROESOPHAGEAL REFLUX DISEASE WITHOUT ESOPHAGITIS: ICD-10-CM

## 2022-08-02 PROCEDURE — 7100000010 HC PHASE II RECOVERY - FIRST 15 MIN: Performed by: INTERNAL MEDICINE

## 2022-08-02 PROCEDURE — 2500000003 HC RX 250 WO HCPCS

## 2022-08-02 PROCEDURE — 3700000000 HC ANESTHESIA ATTENDED CARE: Performed by: INTERNAL MEDICINE

## 2022-08-02 PROCEDURE — 7100000011 HC PHASE II RECOVERY - ADDTL 15 MIN: Performed by: INTERNAL MEDICINE

## 2022-08-02 PROCEDURE — 3609017100 HC EGD: Performed by: INTERNAL MEDICINE

## 2022-08-02 PROCEDURE — 6360000002 HC RX W HCPCS

## 2022-08-02 PROCEDURE — 3609010600 HC COLONOSCOPY POLYPECTOMY SNARE/COLD BIOPSY: Performed by: INTERNAL MEDICINE

## 2022-08-02 PROCEDURE — 2580000003 HC RX 258: Performed by: STUDENT IN AN ORGANIZED HEALTH CARE EDUCATION/TRAINING PROGRAM

## 2022-08-02 PROCEDURE — 88305 TISSUE EXAM BY PATHOLOGIST: CPT

## 2022-08-02 PROCEDURE — 2709999900 HC NON-CHARGEABLE SUPPLY: Performed by: INTERNAL MEDICINE

## 2022-08-02 PROCEDURE — 3700000001 HC ADD 15 MINUTES (ANESTHESIA): Performed by: INTERNAL MEDICINE

## 2022-08-02 RX ORDER — SODIUM CHLORIDE 0.9 % (FLUSH) 0.9 %
5-40 SYRINGE (ML) INJECTION EVERY 12 HOURS SCHEDULED
Status: DISCONTINUED | OUTPATIENT
Start: 2022-08-02 | End: 2022-08-02 | Stop reason: HOSPADM

## 2022-08-02 RX ORDER — PROPOFOL 10 MG/ML
INJECTION, EMULSION INTRAVENOUS PRN
Status: DISCONTINUED | OUTPATIENT
Start: 2022-08-02 | End: 2022-08-02 | Stop reason: SDUPTHER

## 2022-08-02 RX ORDER — LIDOCAINE HYDROCHLORIDE 20 MG/ML
INJECTION, SOLUTION EPIDURAL; INFILTRATION; INTRACAUDAL; PERINEURAL PRN
Status: DISCONTINUED | OUTPATIENT
Start: 2022-08-02 | End: 2022-08-02 | Stop reason: SDUPTHER

## 2022-08-02 RX ORDER — SODIUM CHLORIDE 0.9 % (FLUSH) 0.9 %
5-40 SYRINGE (ML) INJECTION PRN
Status: DISCONTINUED | OUTPATIENT
Start: 2022-08-02 | End: 2022-08-02 | Stop reason: HOSPADM

## 2022-08-02 RX ORDER — SODIUM CHLORIDE 9 MG/ML
INJECTION, SOLUTION INTRAVENOUS CONTINUOUS
Status: DISCONTINUED | OUTPATIENT
Start: 2022-08-02 | End: 2022-08-02 | Stop reason: HOSPADM

## 2022-08-02 RX ORDER — SODIUM CHLORIDE 9 MG/ML
INJECTION, SOLUTION INTRAVENOUS PRN
Status: DISCONTINUED | OUTPATIENT
Start: 2022-08-02 | End: 2022-08-02 | Stop reason: HOSPADM

## 2022-08-02 RX ADMIN — PROPOFOL 100 MG: 10 INJECTION, EMULSION INTRAVENOUS at 10:07

## 2022-08-02 RX ADMIN — PROPOFOL 50 MG: 10 INJECTION, EMULSION INTRAVENOUS at 09:53

## 2022-08-02 RX ADMIN — SODIUM CHLORIDE: 9 INJECTION, SOLUTION INTRAVENOUS at 08:50

## 2022-08-02 RX ADMIN — PROPOFOL 50 MG: 10 INJECTION, EMULSION INTRAVENOUS at 09:47

## 2022-08-02 RX ADMIN — PROPOFOL 50 MG: 10 INJECTION, EMULSION INTRAVENOUS at 09:54

## 2022-08-02 RX ADMIN — LIDOCAINE HYDROCHLORIDE 100 MG: 20 INJECTION, SOLUTION EPIDURAL; INFILTRATION; INTRACAUDAL; PERINEURAL at 09:44

## 2022-08-02 RX ADMIN — PROPOFOL 100 MG: 10 INJECTION, EMULSION INTRAVENOUS at 09:44

## 2022-08-02 RX ADMIN — PROPOFOL 50 MG: 10 INJECTION, EMULSION INTRAVENOUS at 10:00

## 2022-08-02 ASSESSMENT — PAIN SCALES - GENERAL
PAINLEVEL_OUTOF10: 0
PAINLEVEL_OUTOF10: 0

## 2022-08-02 ASSESSMENT — PAIN - FUNCTIONAL ASSESSMENT
PAIN_FUNCTIONAL_ASSESSMENT: ACTIVITIES ARE NOT PREVENTED
PAIN_FUNCTIONAL_ASSESSMENT: 0-10

## 2022-08-02 ASSESSMENT — ENCOUNTER SYMPTOMS: SHORTNESS OF BREATH: 0

## 2022-08-02 ASSESSMENT — PAIN DESCRIPTION - DESCRIPTORS: DESCRIPTORS: ACHING

## 2022-08-02 NOTE — H&P
Gastroenterology Outpatient History and Physical     Patient: Payton Rubio MRN: [id number] Sex: female   YOB: 1965 Age: 64 y.o. 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)     Anxiety     Clotting disorder (HCC)     factor 5 Liden    Depression     GERD (gastroesophageal reflux disease)     HTN (hypertension) 2013    Hyperlipidemia     Interstitial cystitis     Migraine     PONV (postoperative nausea and vomiting)     Skull fracture ([address]         Sleep apnea     Soft tissue sarcoma of abdominal wall ([facility] [address]         Thyroid disease       Past Surgical History:   Procedure Laterality Date    ABDOMEN SURGERY      sarcoma removed, skin graft    BLADDER SURGERY      CERVICAL DISCECTOMY  2013    ant     HYSTERECTOMY (CERVIX STATUS UNKNOWN)      ovaries still in    LITHOTRIPSY      SKIN GRAFT      from leg    THYROID SURGERY  2019    THYROIDECTOMY, PARTIAL      TYMPANOSTOMY TUBE PLACEMENT      UMBILICAL GRANULOMA EXCISION      granuloma where thyroid use to be , got removed 10/19      Social History     Socioeconomic History    Marital status:      Spouse name: None    Number of children: None    Years of education: None    Highest education level: None   Tobacco Use    Smoking status: Former     Packs/day: 0.50     Years: 3.00     Pack years: 1.50     Types: Cigarettes     Quit date: 1984     Years since quittin.5    Smokeless tobacco: Never   Vaping Use    Vaping Use: Never used   Substance and Sexual Activity    Alcohol use: No     Alcohol/week: 0.0 standard drinks    Drug use: No    Sexual activity: Yes     Partners: Male      Family History   Problem Relation Age of Onset    Kidney Disease Mother     Heart Disease Mother     Diabetes Father     Rheum Arthritis Neg Hx     Osteoarthritis Neg Hx     Asthma Neg Hx     Breast Cancer Neg Hx     Cancer Neg Hx     Heart Failure Neg Hx     High Cholesterol Neg Hx     Hypertension Neg Hx     Migraines Neg Hx     Ovarian Cancer Neg Hx     Rashes/Skin Problems Neg Hx     Seizures Neg Hx     Stroke Neg Hx     Thyroid Disease Neg Hx      Allergies: Allergies   Allergen Reactions    Penicillins Hives    Sulfa Antibiotics Hives    Benadryl [Diphenhydramine Hcl] Other (See Comments)     Jittery and insomnia    Codeine Nausea Only    Flagyl [Metronidazole] Nausea Only    Prochlorperazine Edisylate Other (See Comments)     jitters    Reglan [Metoclopramide Hcl] Other (See Comments)     \"put me on edge\"    Sudafed [Pseudoephedrine Hcl] Other (See Comments)     jitters    Dilaudid [Hydromorphone Hcl] Nausea And Vomiting     Medications:   Prior to Admission medications    Medication Sig Start Date End Date Taking? Authorizing Provider   lisinopril (PRINIVIL;ZESTRIL) 10 MG tablet Take 10 mg by mouth in the morning. Yes Historical Provider, MD   pantoprazole (PROTONIX) 40 MG tablet Take 40 mg by mouth in the morning. Yes Historical Provider, MD   ibuprofen (ADVIL;MOTRIN) 200 MG tablet Take 200 mg by mouth every 6 hours as needed for Pain   Yes Historical Provider, MD   levothyroxine (SYNTHROID) 112 MCG tablet Take 112 mcg by mouth 3/5/19   Historical Provider, MD   rosuvastatin (CRESTOR) 10 MG tablet Take 10 mg by mouth daily    Historical Provider, MD   albuterol sulfate HFA (PROVENTIL HFA) 108 (90 BASE) MCG/ACT inhaler Inhale 2 puffs into the lungs every 4 hours as needed for Wheezing or Shortness of Breath (Space out to every 6 hours as symptoms improve) Space out to every 6 hours as symptoms improve.   Please administer a spacer with the MDI. 5/16/16   DIANA De La Torre - CNP   Loratadine (CLARITIN PO) Take by mouth    Historical Provider, MD   therapeutic multivitamin-minerals [facility]) tablet Take 1 tablet by mouth daily. Historical Provider, MD   propranolol (INDERAL) 20 MG tablet Take 160 mg by mouth in the morning and 160 mg before bedtime. Patient not sure of dose. 1/31/11   Historical Provider, MD   FLUoxetine (PROZAC) 40 MG capsule Take 20 mg by mouth daily. Historical Provider, MD   promethazine (PHENERGAN) 25 MG tablet Take 25 mg by mouth every 6 hours as needed. Historical Provider, MD       Vital Signs: BP (!) 174/96   Pulse 76   Temp 97.6 °F (36.4 °C) (Temporal)   Resp 16   Ht 5' 5\" (1.651 m)   Wt 245 lb (111.1 kg)   SpO2 98%   BMI 40.77 kg/m²   Physical Exam:   Heart: regular   Lungs: non-labored breathing  Mental status:  Alert and oriented    ASA score:  ASA 2 - Patient with mild systemic disease with no functional limitations{  Mallimpati score:  2     Planned Procedure: egd and colonoscopy    Planned Sedation:  Monitored anesthesia.     Signed By: Tiffanie Cruz MD   August 2, 2022

## 2022-08-02 NOTE — ANESTHESIA PRE PROCEDURE
Department of Anesthesiology  Preprocedure Note       Name:  Shannon Machado   Age:  64 y.o.  :  1965                                          MRN:  [id number]         Date:  2022      Surgeon: Michelle Reddy):  Taurus Garcia MD    Procedure: Procedure(s):  COLORECTAL CANCER SCREENING, NOT HIGH RISK  EGD ESOPHAGOGASTRODUODENOSCOPY    Medications prior to admission:   Prior to Admission medications    Medication Sig Start Date End Date Taking? Authorizing Provider   lisinopril (PRINIVIL;ZESTRIL) 10 MG tablet Take 10 mg by mouth in the morning. Yes Historical Provider, MD   pantoprazole (PROTONIX) 40 MG tablet Take 40 mg by mouth in the morning. Yes Historical Provider, MD   ibuprofen (ADVIL;MOTRIN) 200 MG tablet Take 200 mg by mouth every 6 hours as needed for Pain   Yes Historical Provider, MD   levothyroxine (SYNTHROID) 112 MCG tablet Take 112 mcg by mouth 3/5/19   Historical Provider, MD   rosuvastatin (CRESTOR) 10 MG tablet Take 10 mg by mouth daily    Historical Provider, MD   albuterol sulfate HFA (PROVENTIL HFA) 108 (90 BASE) MCG/ACT inhaler Inhale 2 puffs into the lungs every 4 hours as needed for Wheezing or Shortness of Breath (Space out to every 6 hours as symptoms improve) Space out to every 6 hours as symptoms improve. Please administer a spacer with the MDI. 16   DIANA Alvarez - CNP   Loratadine (CLARITIN PO) Take by mouth    Historical Provider, MD   therapeutic multivitamin-minerals [facility]) tablet Take 1 tablet by mouth daily. Historical Provider, MD   propranolol (INDERAL) 20 MG tablet Take 160 mg by mouth in the morning and 160 mg before bedtime. Patient not sure of dose. 11   Historical Provider, MD   FLUoxetine (PROZAC) 40 MG capsule Take 20 mg by mouth daily. Historical Provider, MD   promethazine (PHENERGAN) 25 MG tablet Take 25 mg by mouth every 6 hours as needed.     Historical Provider, MD       Current medications:    Current Facility-Administered Medications   Medication Dose Route Frequency Provider Last Rate Last Admin    0.9 % sodium chloride infusion   IntraVENous Continuous Mayito Avery  mL/hr at 08/02/22 0850 New Bag at 08/02/22 0850    sodium chloride flush 0.9 % injection 5-40 mL  5-40 mL IntraVENous 2 times per day Mayito Avery MD        sodium chloride flush 0.9 % injection 5-40 mL  5-40 mL IntraVENous PRN Mayito Avery MD        0.9 % sodium chloride infusion   IntraVENous PRN Mayito Avery MD           Allergies: Allergies   Allergen Reactions    Penicillins Hives    Sulfa Antibiotics Hives    Benadryl [Diphenhydramine Hcl] Other (See Comments)     Jittery and insomnia    Codeine Nausea Only    Flagyl [Metronidazole] Nausea Only    Prochlorperazine Edisylate Other (See Comments)     jitters    Reglan [Metoclopramide Hcl] Other (See Comments)     \"put me on edge\"    Sudafed [Pseudoephedrine Hcl] Other (See Comments)     jitters    Dilaudid [Hydromorphone Hcl] Nausea And Vomiting       Problem List:    Patient Active Problem List   Diagnosis Code    HTN (hypertension) I10    Depression F32. 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)     Anxiety     Clotting disorder (HCC)     factor 5 Liden    Depression     GERD (gastroesophageal reflux disease)     HTN (hypertension) 02/04/2013    Hyperlipidemia     Interstitial cystitis     Migraine     PONV (postoperative nausea and vomiting)     Skull fracture ([facility] [address]     2004    Sleep apnea     Soft tissue sarcoma of abdominal wall ([facility] [address]     1992    Thyroid disease        Past Surgical History:        Procedure Laterality Date    ABDOMEN SURGERY      sarcoma removed, skin graft    BLADDER SURGERY      CERVICAL DISCECTOMY  02/04/2013 ant     HYSTERECTOMY (CERVIX STATUS UNKNOWN)      ovaries still in    LITHOTRIPSY      SKIN GRAFT      from leg    THYROID SURGERY  2019    THYROIDECTOMY, PARTIAL      TYMPANOSTOMY TUBE PLACEMENT      UMBILICAL GRANULOMA EXCISION      granuloma where thyroid use to be , got removed 10/19       Social History:    Social History     Tobacco Use    Smoking status: Former     Packs/day: 0.50     Years: 3.00     Pack years: 1.50     Types: Cigarettes     Quit date: 1984     Years since quittin.5    Smokeless tobacco: Never   Substance Use Topics    Alcohol use: No     Alcohol/week: 0.0 standard drinks                                Counseling given: Not Answered      Vital Signs (Current):   Vitals:    22 1027 22 0841   BP:  (!) 174/96   Pulse:  76   Resp:  16   Temp:  97.6 °F (36.4 °C)   TempSrc:  Temporal   SpO2:  98%   Weight: 250 lb (113.4 kg) 245 lb (111.1 kg)   Height: 5' 5\" (1.651 m) 5' 5\" (1.651 m)                                              BP Readings from Last 3 Encounters:   22 (!) 174/96   19 (!) 145/86   18 (!) 152/90       NPO Status: Time of last liquid consumption: 530                        Time of last solid consumption: 930                        Date of last liquid consumption: 22                        Date of last solid food consumption: 22    BMI:   Wt Readings from Last 3 Encounters:   22 245 lb (111.1 kg)   19 239 lb (108.4 kg)   18 232 lb (105.2 kg)     Body mass index is 40.77 kg/m².     CBC:   Lab Results   Component Value Date/Time    WBC 7.3 05/15/2016 09:30 PM    RBC 4.22 05/15/2016 09:30 PM    HGB 13.4 05/15/2016 09:30 PM    HCT 38.0 05/15/2016 09:30 PM    MCV 90.0 05/15/2016 09:30 PM    RDW 12.6 05/15/2016 09:30 PM     05/15/2016 09:30 PM       CMP:   Lab Results   Component Value Date/Time     05/15/2016 09:30 PM    K 3.6 05/15/2016 09:30 PM     05/15/2016 09:30 PM    CO2 25 05/15/2016 09:30 PM    BUN 8 05/15/2016 09:30 PM    CREATININE 0.7 05/15/2016 09:30 PM    GFRAA >60 05/15/2016 09:30 PM    GFRAA >60 02/05/2013 06:33 AM    AGRATIO 1.4 05/15/2016 09:30 PM    LABGLOM >60 05/15/2016 09:30 PM    GLUCOSE 106 05/15/2016 09:30 PM    PROT 7.3 05/15/2016 09:30 PM    PROT 7.6 07/17/2010 03:10 PM    CALCIUM 9.8 05/15/2016 09:30 PM    BILITOT <0.2 05/15/2016 09:30 PM    ALKPHOS 76 05/15/2016 09:30 PM    AST 13 05/15/2016 09:30 PM    ALT 11 05/15/2016 09:30 PM       POC Tests: No results for input(s): POCGLU, POCNA, POCK, POCCL, POCBUN, POCHEMO, POCHCT in the last 72 hours. Coags:   Lab Results   Component Value Date/Time    PROTIME 11.5 01/22/2013 12:48 PM    INR 1.01 01/22/2013 12:48 PM    APTT 29.7 01/22/2013 12:48 PM       HCG (If Applicable): No results found for: PREGTESTUR, PREGSERUM, HCG, HCGQUANT     ABGs: No results found for: PHART, PO2ART, DYS3MKJ, ORT3BGM, BEART, N7YTXLJA     Type & Screen (If Applicable):  No results found for: LABABO, LABRH    Drug/Infectious Status (If Applicable):  No results found for: HIV, HEPCAB    COVID-19 Screening (If Applicable): No results found for: COVID19        Anesthesia Evaluation  Patient summary reviewed and Nursing notes reviewed   history of anesthetic complications: PONV.   Airway: Mallampati: II  TM distance: >3 FB   Neck ROM: full  Mouth opening: > = 3 FB   Dental: normal exam         Pulmonary: breath sounds clear to auscultation  (+) sleep apnea: on noncompliant,      (-) pneumonia, asthma, shortness of breath and recent URI                           Cardiovascular:  Exercise tolerance: good (>4 METS),   (+) hypertension:, hyperlipidemia    (-) valvular problems/murmurs, past MI, CAD, CABG/stent, dysrhythmias,  angina,  ANDUJAR and no pulmonary hypertension      Rhythm: regular  Rate: normal                    Neuro/Psych:   (+) headaches: migraine headaches, depression/anxiety    (-) seizures, TIA, CVA and psychiatric history

## 2022-08-02 NOTE — OP NOTE
EGD PROCEDURE NOTE         Esophagogastroduodenoscopy Procedure Note     Patient: Mau Mascorro MRN: [id number]   YOB: 1965 Age: 64 y.o. Sex: female       Admitting Physician: Moses Brown     Primary Care Physician: Oneyda Ambriz      DATE OF PROCEDURE: 8/2/2022  PROCEDURE: Esophagogastroduodenoscopy  INDICATION: This is a 64y.o. year old female who presents recent onset gastroesophageal reflux. ENDOSCOPIST: Lillie Esteban MD    POSTOPERATIVE DIAGNOSIS:   1.  Negative egd    Discussion: The patient should try an H2 blocker or famotidineon scheduled basis to see if this controls her symptoms but probably she will need PPI or Protonix daily. PLAN:   1. Continue PPI daily    INFORMED CONSENT:  Informed consent for esophagogastoduodenoscopy was obtained. The benefits and risks including adverse medicine reaction have been explained. The patient's questions were answered and the patient agreed to proceed. ASA:  ASA 2 - Patient with mild systemic disease with no functional limitations    SEDATION: MAC     The patient's vital signs, cardiac status, pulmonary status, abdominal status and mental status were stable for the procedure. The patient's vitals signs and respiratory function as monitored by oxygen saturation were stable throughout    Procedure Details: The Olympus videoendoscope was inserted into the mouth and carefully passed into the esophagus, through the stomach and to the distal duodenum. Antegrade and retrograde examination of the upper gi tract was carefully performed. Findings: The esophagus is normal.  The z-line is distinct without lesions or irregularities. There is no evidence of Cha's esophagus. The scope easily passed into the stomach. The mucosa of the cardia, fundus, body and antrum of the stomach is normal.  The pylorus is patent and of normal contour. The scope easily advanced into the duodenal bulb and down to the distal duodenum. Ante-and retrograde exam of the duodenum is normal.    Gastric or Duodenal ulcer present: No    Estimated Blood Loss: None      Signed By: Dakota Miranda MD

## 2022-08-02 NOTE — DISCHARGE INSTRUCTIONS

## 2022-08-02 NOTE — ANESTHESIA POSTPROCEDURE EVALUATION
Department of Anesthesiology  Postprocedure Note    Patient: Tanner Staton  MRN: [id number]  YOB: 1965  Date of evaluation: 8/2/2022      Procedure Summary     Date: 08/02/22 Room / Location: [address]    Anesthesia Start: 0262 Anesthesia Stop: 1010    Procedures:       COLONOSCOPY POLYPECTOMY SNARE/COLD BIOPSY      EGD ESOPHAGOGASTRODUODENOSCOPY Diagnosis:       Screening for colon cancer      Gastroesophageal reflux disease without esophagitis      (Screening for colon cancer, Gastroesophageal reflux disease)    Surgeons: Rishabh Mejia MD Responsible Provider: Dany Curry MD    Anesthesia Type: MAC ASA Status: 3          Anesthesia Type: No value filed.     Berny Phase I: Berny Score: 10    Berny Phase II: Berny Score: 10      Anesthesia Post Evaluation    Patient location during evaluation: PACU  Patient participation: complete - patient participated  Level of consciousness: awake  Airway patency: patent  Nausea & Vomiting: no nausea and no vomiting  Cardiovascular status: blood pressure returned to baseline  Respiratory status: acceptable  Hydration status: stable  Multimodal analgesia pain management approach

## 2022-08-06 NOTE — RESULT ENCOUNTER NOTE
EGD and colonoscopy were done August 2 for gastroesophageal reflux and colon cancer screening. EGD was negative. Colonoscopy showed a small sigmoid colon hyperplastic polyp. I reported the results to the patient and recommended a screening colonoscopy in 10 years. We discussed the management of gastroesophageal reflux disease. She may try an H2 blocker to see if this controls her symptoms but for now she will stay on PPI daily as this has been effective.

## (undated) DEVICE — SNARE ENDOSCP POLYP 2.4 MM 240 CM 10 MM 2.8 MM CAPTIVATOR